# Patient Record
Sex: FEMALE | Race: WHITE | NOT HISPANIC OR LATINO | Employment: FULL TIME | URBAN - METROPOLITAN AREA
[De-identification: names, ages, dates, MRNs, and addresses within clinical notes are randomized per-mention and may not be internally consistent; named-entity substitution may affect disease eponyms.]

---

## 2023-10-09 ENCOUNTER — ANCILLARY PROCEDURE (OUTPATIENT)
Dept: RADIOLOGY | Facility: CLINIC | Age: 58
End: 2023-10-09
Payer: COMMERCIAL

## 2023-10-09 DIAGNOSIS — Z12.31 ENCOUNTER FOR SCREENING MAMMOGRAM FOR MALIGNANT NEOPLASM OF BREAST: ICD-10-CM

## 2023-10-09 PROCEDURE — 77063 BREAST TOMOSYNTHESIS BI: CPT

## 2023-10-09 PROCEDURE — 77063 BREAST TOMOSYNTHESIS BI: CPT | Mod: BILATERAL PROCEDURE | Performed by: RADIOLOGY

## 2023-10-09 PROCEDURE — 77067 SCR MAMMO BI INCL CAD: CPT | Mod: BILATERAL PROCEDURE | Performed by: RADIOLOGY

## 2024-09-30 ENCOUNTER — APPOINTMENT (OUTPATIENT)
Dept: OBSTETRICS AND GYNECOLOGY | Facility: CLINIC | Age: 59
End: 2024-09-30
Payer: COMMERCIAL

## 2024-09-30 VITALS
WEIGHT: 244 LBS | DIASTOLIC BLOOD PRESSURE: 80 MMHG | BODY MASS INDEX: 47.91 KG/M2 | SYSTOLIC BLOOD PRESSURE: 122 MMHG | HEIGHT: 60 IN

## 2024-09-30 DIAGNOSIS — Z01.419 WELL WOMAN EXAM WITH ROUTINE GYNECOLOGICAL EXAM: ICD-10-CM

## 2024-09-30 DIAGNOSIS — Z12.31 BREAST CANCER SCREENING BY MAMMOGRAM: ICD-10-CM

## 2024-09-30 DIAGNOSIS — B37.31 YEAST VAGINITIS: Primary | ICD-10-CM

## 2024-09-30 PROCEDURE — 3008F BODY MASS INDEX DOCD: CPT | Performed by: OBSTETRICS & GYNECOLOGY

## 2024-09-30 PROCEDURE — 1036F TOBACCO NON-USER: CPT | Performed by: OBSTETRICS & GYNECOLOGY

## 2024-09-30 PROCEDURE — 88175 CYTOPATH C/V AUTO FLUID REDO: CPT

## 2024-09-30 PROCEDURE — 99396 PREV VISIT EST AGE 40-64: CPT | Performed by: OBSTETRICS & GYNECOLOGY

## 2024-09-30 RX ORDER — METFORMIN HYDROCHLORIDE 1000 MG/1
TABLET ORAL
COMMUNITY
Start: 2021-08-23

## 2024-09-30 RX ORDER — INSULIN GLARGINE 100 [IU]/ML
INJECTION, SOLUTION SUBCUTANEOUS
COMMUNITY
Start: 2022-08-29

## 2024-09-30 RX ORDER — VITS A,C,E/LUTEIN/MINERALS 300MCG-200
TABLET ORAL
COMMUNITY
Start: 2021-08-23

## 2024-09-30 RX ORDER — LISINOPRIL 20 MG/1
TABLET ORAL
COMMUNITY
Start: 2024-08-27

## 2024-09-30 RX ORDER — FLUCONAZOLE 150 MG/1
150 TABLET ORAL ONCE
Qty: 1 TABLET | Refills: 0 | Status: SHIPPED | OUTPATIENT
Start: 2024-09-30 | End: 2024-09-30

## 2024-09-30 RX ORDER — PRAVASTATIN SODIUM 20 MG/1
TABLET ORAL
COMMUNITY
Start: 2021-08-23

## 2024-09-30 ASSESSMENT — PAIN SCALES - GENERAL: PAINLEVEL: 4

## 2024-09-30 NOTE — PROGRESS NOTES
Subjective   Patient ID: Guerline Romero is a 58 y.o. female who presents for Well Women Visit (Annual exam with pap smear and mammogram order//No complaints//Chaperone declined).  HPI  As contained in the chief complaint  Review of Systems  10 systems have been reviewed and are negative and noncontributory to the patient current ailments.    Objective   Physical Exam  Vital signs reviewed    CONSTITUTIONAL- well nourished, well developed, looks like stated age.  She is sitting comfortably on the exam table in no apparent distress  SKIN- normal skin color and pigmentation no visible lesions  EYES- normal external exam  THYROID- symmetrical ,normal size and normal consistency  HEART- RRR without murmur, S3 or S4.  LUNGS- breathing comfortably, no dyspnea  EXTREMITIES- no deformities.  Edema, discoloration, or pain in BLE  NEUROLOGICAL- A/Ox3, conversational   PSYCHIATRIC- alert, pleasant and cordial, age-appropriate, not anxious, or depressed appearing  BREASTS-normal appearance, no skin changes or nipple discharge.  Palpation of the breast and axillae: No palpable mass and no axillary lymphadenopathy  ABDOMEN- soft, non distended, bowel sound normal pitch and intensity,no palpable abnormal masses  GENITOURINARY- External genitalia: Normal.                                 - Uterus: AV/AF NSSC, mobile and nontender                                -The adnexal areas are free of tenderness or mass                                -There are no cervical lesions; there is no cervical motion tenderness                                -Vagina without lesions, mucosa pink and well-hydrated, discharge is consistent with yeast vaginitis                                   Inspection of Perianal Area: Normal    Assessment/Plan   Diagnoses and all orders for this visit:  Well woman exam with routine gynecological exam  Breast cancer screening by mammogram  -Screening colonoscopy discussed patient not in favor at this time          Jignesh Briseno DO 09/30/24 9:48 AM

## 2024-10-11 LAB
CYTOLOGY CMNT CVX/VAG CYTO-IMP: NORMAL
LAB AP HPV GENOTYPE QUESTION: YES
LAB AP HPV HR: NORMAL
LABORATORY COMMENT REPORT: NORMAL
MENSTRUAL HX REPORTED: NORMAL
PATH REPORT.TOTAL CANCER: NORMAL

## 2024-10-21 ENCOUNTER — HOSPITAL ENCOUNTER (OUTPATIENT)
Dept: RADIOLOGY | Facility: CLINIC | Age: 59
Discharge: HOME | End: 2024-10-21
Payer: COMMERCIAL

## 2024-10-21 VITALS — WEIGHT: 244.71 LBS | HEIGHT: 60 IN | BODY MASS INDEX: 48.04 KG/M2

## 2024-10-21 DIAGNOSIS — Z12.31 BREAST CANCER SCREENING BY MAMMOGRAM: ICD-10-CM

## 2024-10-21 PROCEDURE — 77067 SCR MAMMO BI INCL CAD: CPT

## 2024-10-21 PROCEDURE — 77067 SCR MAMMO BI INCL CAD: CPT | Mod: BILATERAL PROCEDURE | Performed by: RADIOLOGY

## 2024-10-21 PROCEDURE — 77063 BREAST TOMOSYNTHESIS BI: CPT | Mod: BILATERAL PROCEDURE | Performed by: RADIOLOGY

## 2024-10-23 ENCOUNTER — TELEPHONE (OUTPATIENT)
Dept: OBSTETRICS AND GYNECOLOGY | Facility: CLINIC | Age: 59
End: 2024-10-23
Payer: COMMERCIAL

## 2024-10-23 NOTE — TELEPHONE ENCOUNTER
Patient called stating she took Diflucan for a yeast infection and on Sunday noticed that one side of the vaginal was swollen and inflamed please advise.

## 2024-10-25 ENCOUNTER — OFFICE VISIT (OUTPATIENT)
Dept: OBSTETRICS AND GYNECOLOGY | Facility: CLINIC | Age: 59
End: 2024-10-25
Payer: COMMERCIAL

## 2024-10-25 VITALS — HEIGHT: 60 IN | BODY MASS INDEX: 48.29 KG/M2 | WEIGHT: 246 LBS

## 2024-10-25 DIAGNOSIS — L02.91 ABSCESS: ICD-10-CM

## 2024-10-25 PROCEDURE — 87205 SMEAR GRAM STAIN: CPT

## 2024-10-25 PROCEDURE — 87070 CULTURE OTHR SPECIMN AEROBIC: CPT

## 2024-10-25 PROCEDURE — 3008F BODY MASS INDEX DOCD: CPT | Performed by: OBSTETRICS & GYNECOLOGY

## 2024-10-25 PROCEDURE — 87075 CULTR BACTERIA EXCEPT BLOOD: CPT

## 2024-10-25 PROCEDURE — 87185 SC STD ENZYME DETCJ PER NZM: CPT

## 2024-10-25 PROCEDURE — 10060 I&D ABSCESS SIMPLE/SINGLE: CPT | Performed by: OBSTETRICS & GYNECOLOGY

## 2024-10-25 PROCEDURE — 1036F TOBACCO NON-USER: CPT | Performed by: OBSTETRICS & GYNECOLOGY

## 2024-10-25 RX ORDER — CEPHALEXIN 500 MG/1
500 CAPSULE ORAL 3 TIMES DAILY
Qty: 21 CAPSULE | Refills: 0 | Status: SHIPPED | OUTPATIENT
Start: 2024-10-25 | End: 2024-11-01

## 2024-10-25 NOTE — PROGRESS NOTES
Subjective   Patient ID: Guerline Romero is a 58 y.o. female who presents for Abscess (Possible Abscess//On right side of labia/- it did burst and feels better but still there//Chaperone declined).  HPI  As contained in the chief complaint  Review of Systems  10 systems have been reviewed and are negative and noncontributory to the patient current ailments.    Objective   Physical Exam  Vital signs reviewed    CONSTITUTIONAL- well nourished, well developed, looks like stated age.                                 - Uterus: AV/AF NSSC, mobile and nontender                                -The adnexal areas are free of tenderness or mass                                -There are no cervical lesions; there is no cervical motion tenderness                              -Noted is a 1.5 cm fluctuant right labial abscess                                 Inspection of Perianal Area: Normal    Assessment/Plan   Diagnoses and all orders for this visit:  Abscess  -     Tissue/Wound Culture/Smear  -The labial abscess was infiltrated with 1% lidocaine a linear incision was made on the area of maximum distention.  Serosanguinous purulent fluid was expressed.  A culture was taken.  Hemostasis was obtained with the application of a small amount of Monsel solution.  No complications were encountered the patient left the office in clinically sound condition.  The use of an oral antibiotic as well as sitz bath's 2-3 times daily.  Return to the office in 1 week or before as needed         Jignesh Briseno DO 10/25/24 1:45 PM

## 2024-10-27 LAB
BACTERIA SPEC CULT: ABNORMAL
GRAM STN SPEC: ABNORMAL
GRAM STN SPEC: ABNORMAL

## 2024-10-28 LAB
B-LACTAMASE ORGANISM ISLT: POSITIVE
BACTERIA SPEC CULT: ABNORMAL
BACTERIA SPEC CULT: ABNORMAL
GRAM STN SPEC: ABNORMAL
GRAM STN SPEC: ABNORMAL